# Patient Record
Sex: MALE | Race: WHITE | NOT HISPANIC OR LATINO | Employment: UNEMPLOYED | URBAN - METROPOLITAN AREA
[De-identification: names, ages, dates, MRNs, and addresses within clinical notes are randomized per-mention and may not be internally consistent; named-entity substitution may affect disease eponyms.]

---

## 2021-05-31 ENCOUNTER — RECORDS - HEALTHEAST (OUTPATIENT)
Dept: ADMINISTRATIVE | Facility: CLINIC | Age: 36
End: 2021-05-31

## 2022-08-26 ENCOUNTER — OFFICE VISIT (OUTPATIENT)
Dept: URGENT CARE | Facility: URGENT CARE | Age: 37
End: 2022-08-26
Payer: COMMERCIAL

## 2022-08-26 VITALS
HEART RATE: 68 BPM | OXYGEN SATURATION: 99 % | SYSTOLIC BLOOD PRESSURE: 126 MMHG | TEMPERATURE: 97.1 F | DIASTOLIC BLOOD PRESSURE: 84 MMHG

## 2022-08-26 DIAGNOSIS — S61.213A LACERATION OF LEFT MIDDLE FINGER WITHOUT FOREIGN BODY WITHOUT DAMAGE TO NAIL, INITIAL ENCOUNTER: Primary | ICD-10-CM

## 2022-08-26 PROCEDURE — 99203 OFFICE O/P NEW LOW 30 MIN: CPT | Performed by: FAMILY MEDICINE

## 2022-08-26 NOTE — PROGRESS NOTES
SUBJECTIVE:     Chief Complaint   Patient presents with     Laceration     Left middle finger laceration this morning with knife. Last [facility] 11/14/18     Ady Santos is a 36 year old male who presents to the clinic with a laceration on the left finger sustained 3 hour(s) ago.  This is a non-work related injury.    Mechanism of injury: knife.    Associated symptoms: Denies numbness, weakness, or loss of function  Last tetanus booster within 10 years: yes    EXAM:   The patient appears today in alert,no apparent distress distress  VITALS: /84   Pulse 68   Temp 97.1  F (36.2  C)   SpO2 99%     Size of laceration: 5 mm  Skin open tip of middle finger. This starts actively bleeding with removal of pressure  telfa applied and a pressure dressin gof tubo gauze was applied bleeding is controlled     Assessment:  Laceration of left middle finger without foreign body without damage to nail, initial encounter    PLAN:      After care instructions:  Keep wound clean and dry for the next 24-48 hours  Signs of infection discussed today  Eleonora Vaughan M.D.

## 2022-09-23 ENCOUNTER — OFFICE VISIT (OUTPATIENT)
Dept: URGENT CARE | Facility: URGENT CARE | Age: 37
End: 2022-09-23
Payer: COMMERCIAL

## 2022-09-23 VITALS
TEMPERATURE: 97 F | WEIGHT: 202 LBS | HEART RATE: 85 BPM | DIASTOLIC BLOOD PRESSURE: 85 MMHG | SYSTOLIC BLOOD PRESSURE: 124 MMHG | OXYGEN SATURATION: 97 %

## 2022-09-23 DIAGNOSIS — M10.032 ACUTE IDIOPATHIC GOUT OF LEFT WRIST: Primary | ICD-10-CM

## 2022-09-23 PROCEDURE — 99213 OFFICE O/P EST LOW 20 MIN: CPT | Performed by: FAMILY MEDICINE

## 2022-09-23 RX ORDER — ALLOPURINOL 100 MG/1
100 TABLET ORAL DAILY
Qty: 30 TABLET | Refills: 1 | Status: SHIPPED | OUTPATIENT
Start: 2022-09-23

## 2022-09-23 RX ORDER — PREDNISONE 20 MG/1
40 TABLET ORAL DAILY
Qty: 10 TABLET | Refills: 1 | Status: SHIPPED | OUTPATIENT
Start: 2022-09-23 | End: 2022-09-28

## 2022-09-23 NOTE — PROGRESS NOTES
SUBJECTIVE:  Chief Complaint   Patient presents with     Arthritis     Left wrist, started 2 days ago, says it is painful and can't really move wrist. Has had this before.      Ady Santos is a 36 year old male who presents with a chief complaint of left wrist pain.  Symptoms began 1 day(s) ago, are moderate and sudden onset  Context:  Injury:No.  Injury happened while . How:  .   Pain exacerbated by movement Relieved by rest and ice.  He treated it initially with Ibuprofen. This is the first time this type of injury has occurred to this patient.     No past medical history on file.  No current outpatient medications on file.     Social History     Tobacco Use     Smoking status: Not on file     Smokeless tobacco: Not on file   Substance Use Topics     Alcohol use: Not on file       ROS:  Review of systems negative except as stated below    EXAM:   /85   Pulse 85   Temp 97  F (36.1  C) (Tympanic)   Wt 91.6 kg (202 lb)   SpO2 97%   M/S Exam:wristerythema, swelling, tenderness to palpation and decreased ROM GENERAL APPEARANCE: healthy, alert and no distress  EXTREMITIES: peripheral pulses normal  SKIN: no suspicious lesions or rashes  NEURO: Normal strength and tone, sensory exam grossly normal, mentation intact and speech normal    X-RAY was not done.    1. Acute idiopathic gout of left wrist    - predniSONE (DELTASONE) 20 MG tablet; Take 2 tablets (40 mg) by mouth daily for 5 days  Dispense: 10 tablet; Refill: 1  - allopurinol (ZYLOPRIM) 100 MG tablet; Take 1 tablet (100 mg) by mouth daily When gout flare is resolved  Dispense: 30 tablet; Refill: 1  Please establish with a primary care physician.  You will need to have them check your uric acid after you start the allopurinol.  You also need to see them to have the medication be titrated up.  Eleonora Vaughan M.D.